# Patient Record
Sex: FEMALE | Race: WHITE
[De-identification: names, ages, dates, MRNs, and addresses within clinical notes are randomized per-mention and may not be internally consistent; named-entity substitution may affect disease eponyms.]

---

## 2019-03-01 ENCOUNTER — HOSPITAL ENCOUNTER (INPATIENT)
Dept: HOSPITAL 7 - FB.ED | Age: 69
LOS: 1 days | DRG: 193 | End: 2019-03-02
Attending: FAMILY MEDICINE | Admitting: FAMILY MEDICINE
Payer: MEDICARE

## 2019-03-01 DIAGNOSIS — C50.912: ICD-10-CM

## 2019-03-01 DIAGNOSIS — I24.8: ICD-10-CM

## 2019-03-01 DIAGNOSIS — E88.09: ICD-10-CM

## 2019-03-01 DIAGNOSIS — R79.89: ICD-10-CM

## 2019-03-01 DIAGNOSIS — E87.2: ICD-10-CM

## 2019-03-01 DIAGNOSIS — F32.9: ICD-10-CM

## 2019-03-01 DIAGNOSIS — E03.9: ICD-10-CM

## 2019-03-01 DIAGNOSIS — Z88.8: ICD-10-CM

## 2019-03-01 DIAGNOSIS — E11.42: ICD-10-CM

## 2019-03-01 DIAGNOSIS — Z51.5: ICD-10-CM

## 2019-03-01 DIAGNOSIS — Q60.0: ICD-10-CM

## 2019-03-01 DIAGNOSIS — Z79.4: ICD-10-CM

## 2019-03-01 DIAGNOSIS — N18.9: ICD-10-CM

## 2019-03-01 DIAGNOSIS — R49.0: ICD-10-CM

## 2019-03-01 DIAGNOSIS — D64.9: ICD-10-CM

## 2019-03-01 DIAGNOSIS — M19.90: ICD-10-CM

## 2019-03-01 DIAGNOSIS — R65.11: ICD-10-CM

## 2019-03-01 DIAGNOSIS — I12.9: ICD-10-CM

## 2019-03-01 DIAGNOSIS — J18.1: Primary | ICD-10-CM

## 2019-03-01 DIAGNOSIS — C78.7: ICD-10-CM

## 2019-03-01 DIAGNOSIS — N17.9: ICD-10-CM

## 2019-03-01 DIAGNOSIS — E78.5: ICD-10-CM

## 2019-03-01 DIAGNOSIS — D50.9: ICD-10-CM

## 2019-03-01 DIAGNOSIS — R74.8: ICD-10-CM
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
## 2019-03-01 DIAGNOSIS — Z79.890: ICD-10-CM

## 2019-03-01 DIAGNOSIS — R00.0: ICD-10-CM

## 2019-03-01 DIAGNOSIS — D84.9: ICD-10-CM

## 2019-03-01 DIAGNOSIS — K72.00: ICD-10-CM

## 2019-03-01 DIAGNOSIS — C78.02: ICD-10-CM

## 2019-03-01 DIAGNOSIS — J38.00: ICD-10-CM

## 2019-03-01 DIAGNOSIS — Z85.830: ICD-10-CM

## 2019-03-01 DIAGNOSIS — R06.02: ICD-10-CM

## 2019-03-01 DIAGNOSIS — Z79.899: ICD-10-CM

## 2019-03-01 DIAGNOSIS — Z87.891: ICD-10-CM

## 2019-03-01 DIAGNOSIS — J15.9: ICD-10-CM

## 2019-03-01 DIAGNOSIS — R06.00: ICD-10-CM

## 2019-03-01 DIAGNOSIS — R06.2: ICD-10-CM

## 2019-03-01 DIAGNOSIS — C78.01: ICD-10-CM

## 2019-03-01 DIAGNOSIS — R06.82: ICD-10-CM

## 2019-03-01 DIAGNOSIS — I46.9: ICD-10-CM

## 2019-03-01 DIAGNOSIS — I45.81: ICD-10-CM

## 2019-03-01 DIAGNOSIS — E86.0: ICD-10-CM

## 2019-03-01 DIAGNOSIS — J96.90: ICD-10-CM

## 2019-03-01 DIAGNOSIS — E11.65: ICD-10-CM

## 2019-03-01 DIAGNOSIS — Z91.048: ICD-10-CM

## 2019-03-01 DIAGNOSIS — R53.1: ICD-10-CM

## 2019-03-01 DIAGNOSIS — E11.22: ICD-10-CM

## 2019-03-01 PROCEDURE — 36415 COLL VENOUS BLD VENIPUNCTURE: CPT

## 2019-03-01 PROCEDURE — 86140 C-REACTIVE PROTEIN: CPT

## 2019-03-01 PROCEDURE — 83605 ASSAY OF LACTIC ACID: CPT

## 2019-03-01 PROCEDURE — 85025 COMPLETE CBC W/AUTO DIFF WBC: CPT

## 2019-03-01 PROCEDURE — 96365 THER/PROPH/DIAG IV INF INIT: CPT

## 2019-03-01 PROCEDURE — 96367 TX/PROPH/DG ADDL SEQ IV INF: CPT

## 2019-03-01 PROCEDURE — 87040 BLOOD CULTURE FOR BACTERIA: CPT

## 2019-03-01 PROCEDURE — 84484 ASSAY OF TROPONIN QUANT: CPT

## 2019-03-01 PROCEDURE — 94640 AIRWAY INHALATION TREATMENT: CPT

## 2019-03-01 PROCEDURE — 71046 X-RAY EXAM CHEST 2 VIEWS: CPT

## 2019-03-01 PROCEDURE — 80053 COMPREHEN METABOLIC PANEL: CPT

## 2019-03-01 PROCEDURE — 93005 ELECTROCARDIOGRAM TRACING: CPT

## 2019-03-01 NOTE — EDM.PDOC
ED HPI GENERAL MEDICAL PROBLEM





- General


Stated Complaint: SOB


Time Seen by Provider: 19 22:10


Source of Information: Reports: Patient, Family


History Limitations: Reports: No Limitations





- History of Present Illness


INITIAL COMMENTS - FREE TEXT/NARRATIVE: 





c/o weak, sob x 2d





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





on chemo q3w, received 2 doses, no chemo 2d ago d/t low WBC





lives alone, here with daughter Eli and son-in-law Best





quite weak x 2d, difficulty to sit in w/c





has had inc'd sob, no cough, poor appetitie, no f/c/d





is tachy now with rales at the L base





h/o 0.5 ppd x 20y, stopped 20y ago





dx breast CA 7.5y ago





in [place] 5w ago for 6d, had CA compressing her vocal cords and is hoarse, had 

gotten RT in past, not in past 2m, voice has been hoarse





- Related Data


 Allergies











Allergy/AdvReac Type Severity Reaction Status Date / Time


 


lisinipril Allergy  Edema Uncoded 19 22:22


 


seasonal allergies Allergy  Other Uncoded 19 22:22











Home Meds: 


 Home Meds





Calcium Carbonate [Calcium] 600 mg PO DAILY 16 [History]


Cetirizine [ZyrTEC] 10 mg PO DAILY 16 [History]


Gabapentin [Neurontin] 300 mg PO BEDTIME 16 [History]


Insulin Glarg,Human.Rec.Analog [Lantus Solostar] 12 units SUBCUT BEDTIME  [History]


Levothyroxine Sodium [Synthroid] 150 mcg PO DAILY 16 [History]


Metoprolol Tartrate 25 mg PO BID 16 [History]


Zolpidem Tartrate [Zolpidem Tartrate ER] 12.5 mg PO BEDTIME PRN 16 [

History]


hydroCHLOROthiazide [Hydrochlorothiazide] 12.5 mg PO DAILY 16 [History]


Cholecalciferol (Vitamin D3) [Vitamin D3] 2,000 unit PO BEDTIME 17 [

History]


Hydrocodone/Acetaminophen [Hydrocodon-Acetaminophn ] 1 - 2 tab PO Q4H PRN 

17 [History]


Melatonin 5 mg PO BEDTIME 17 [History]


Omeprazole 20 mg PO DAILY 17 [History]


Potassium Chloride 10 meq PO BEDTIME 17 [History]


metFORMIN [Glucophage] 1,000 mg PO BIDMEALS 17 [History]


Escitalopram [Lexapro] 10 mg PO DAILY 19 [History]











Past Medical History


HEENT History: Reports: Other (See Below)


Other HEENT History: pt has hoarse sounding voice at times.  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


Cardiovascular History: Reports: Hypertension


Respiratory History: Reports: Other (See Below)


Other Respiratory History: seasonal allergies and gets sinus congestion with 

this.


Gastrointestinal History: Reports: Other (See Below)


Other Gastrointestinal History: only has one kidney


OB/GYN History: Reports: Pregnancy


Other OB/GYN History: 


Musculoskeletal History: Reports: Osteoarthritis


Neurological History: Reports: Neuropathy, Peripheral


Psychiatric History: Reports: Depression


Endocrine/Metabolic History: Reports: Diabetes, Type II, Hypothyroidism


Hematologic History: Reports: None, Blood Transfusion(s)


Immunologic History: Reports: None


Oncologic (Cancer) History: Reports: Other (See Below)


Other Oncologic History: METASTATIC STAGE FOUR LEFT BREAST CANCER AT PRESENT 

TIME.


Dermatologic History: Reports: Other (See Below)


Other Dermatologic History: PT HAS SEVERAL HEALING CIRCULAR SORES ON BODY AFTER 

CHEMO TREATMENTS AT PRESENT TIME.  NONE ARE OPEN OR DRAINING AT PRESENT TIME.





- Infectious Disease History


Infectious Disease History: Reports: Chicken Pox, Measles





- Past Surgical History


HEENT Surgical History: Reports: None


Cardiovascular Surgical History: Reports: None


Respiratory Surgical History: Reports: None


Other Female  Surgeries/Procedures: "I have only one kidney, doesn't know 

which one--was found on a CT scan"


Neurological Surgical History: Reports: None


Oncologic Surgical History: Reports: Biopsy of Breast





Social & Family History





- Family History


Family Medical History: Noncontributory


Cardiac: Reports: MI


: Reports: Renal Disease/Insufficiency


Other Dermatologic Family History: dad natural causes, mom old age





- Caffeine Use


Caffeine Use: Reports: Other


Other Caffeine Use: occasionally, not daily





ED ROS GENERAL





- Review of Systems


Review Of Systems: See Below


Constitutional: Reports: Weakness, Fatigue, Decreased Appetite, Weight Loss.  

Denies: Fever, Chills, Night Sweats, Diaphoresis


HEENT: Reports: No Symptoms


Respiratory: Reports: Shortness of Breath, Cough


Cardiovascular: Reports: No Symptoms


Endocrine: Reports: No Symptoms


GI/Abdominal: Reports: No Symptoms, Other (soft normal BMs)


: Reports: No Symptoms


Musculoskeletal: Reports: No Symptoms


Skin: Reports: No Symptoms


Neurological: Reports: No Symptoms


Psychiatric: Reports: No Symptoms


Hematologic/Lymphatic: Reports: No Symptoms


Immunologic: Reports: No Symptoms





ED EXAM, GENERAL





- Physical Exam


Exam: See Below


Exam Limited By: No Limitations


General Appearance: Alert, WD/WN, Mild Distress, Other (sit in w/c slumped 

forward, good eye contact, voice hoarse, tries to be pleasant, little energy, 

nontoxic, mild dyspnea, no cough observed, quite tachy, wheezes noted b/l, 

rales at L base)


Eye Exam: Bilateral Eye: Normal Inspection, PERRL


Ears: Normal External Exam, Hearing Grossly Normal


Nose: Normal Inspection, Normal Mucosa, No Blood


Throat/Mouth: Normal Inspection, Normal Lips, Normal Teeth, Normal Gums, Normal 

Oropharynx, Normal Voice, No Airway Compromise


Head: Atraumatic, Normocephalic


Neck: Normal Inspection, Supple, Non-Tender, Full Range of Motion.  No: 

Lymphadenopathy (R), Lymphadenopathy (L)


Respiratory/Chest: Chest Non-Tender, Other (mild tachypnea, rales L base, mild 

insp/exp wheezes b/l, no retractions, no purse lip, slight use of accessory 

muscles at times)


Cardiovascular: No Edema, No Gallop, No JVD, No Rub, Other (mild dec'd turgor, )


GI/Abdominal: Normal Bowel Sounds, Soft, Non-Tender, No Organomegaly, No 

Distention, No Mass


Back Exam: Normal Inspection, Full Range of Motion.  No: CVA Tenderness (R), 

CVA Tenderness (L)


Extremities: Normal Inspection, Normal Range of Motion, Non-Tender, No Pedal 

Edema, Other (dec'd turgor, no tenting)


Neurological: Alert, Oriented, CN II-XII Intact, Normal Cognition, No Motor/

Sensory Deficits


Psychiatric: Normal Affect, Normal Mood


Skin Exam: Warm, Dry, Intact, Normal Color, No Rash


Lymphatic: No Adenopathy





Course





- Vital Signs


Last Recorded V/S: 


 Last Vital Signs











Temp  37.3 C   19 22:15


 


Pulse  140 H  19 22:15


 


Resp  24 H  19 22:15


 


BP  110/57 L  19 22:15


 


Pulse Ox  92 L  19 22:15














- Orders/Labs/Meds


Orders: 


 Active Orders 24 hr











 Category Date Time Status


 


 Admission Status [Patient Status] [ADT] Routine ADT  19 00:03 Ordered


 


 EKG Documentation Completion [RC] ASDIRECTED Care  19 22:22 Ordered


 


 RT Aerosol Therapy [RC] ASDIRECTED Care  19 22:52 Ordered


 


 RT Aerosol Therapy [RC] ASDIRECTED Care  19 00:03 Ordered


 


 Chest 2V [CR] Stat Exams  19 22:20 Ordered


 


 CULTURE BLOOD [BC] Routine Lab  19 Ordered


 


 CULTURE BLOOD [BC] Stat Lab  19 22:20 Ordered


 


 INFLUENZA A+B AG SCREEN [RM] Stat Lab  19 22:24 Ordered


 


 UA W/MICROSCOPIC [URIN] Stat Lab  19 22:20 Ordered


 


 Heparin Sodium [Heparin Lock Flush 100 Units/ML] Med  19 00:01 Ordered





 500 units FLUSH ASDIRECTED PRN   


 


 Piperacillin/Tazobactam [Zosyn] 4.5 gm Med  19 22:30 Ordered





 Sodium Chloride 0.9% [Normal Saline] 100 ml   





 IV Q6H   


 


 EKG 12 Lead [EK] Routine Ther  19 22:20 Ordered








 Medication Orders





Heparin Sodium (Porcine) (Heparin Lock Flush 100 Units/Ml)  500 units FLUSH 

ASDIRECTED PRN


   PRN Reason: Keep Vein Open


Piperacillin Sod/Tazobactam (Sod 4.5 gm/ Sodium Chloride)  100 mls @ 200 mls/hr 

IV Q6H ANTHONY


   Last Admin: 19 23:16  Dose: 200 mls/hr








Labs: 


 Laboratory Tests











  19 Range/Units





  22:45 22:45 22:45 


 


WBC  12.3 H    (4.5-12.0)  X10-3/uL


 


RBC  3.16 L    (3.23-5.20)  x10(6)uL


 


Hgb  8.7 L    (11.5-15.5)  g/dL


 


Hct  27.0 L    (30.0-51.3)  %


 


MCV  85.5    (80-96)  fL


 


MCH  27.7    (27.7-33.6)  pg


 


MCHC  32.4    (32.2-35.4)  g/dL


 


RDW  20.7 H    (11.5-15.5)  %


 


Plt Count  666 H    (125-369)  X10(3)uL


 


MPV  7.7    (7.4-10.4)  fL


 


Neut % (Auto)  89.3 H    (46-82)  %


 


Lymph % (Auto)  7.0 L    (13-37)  %


 


Mono % (Auto)  3.5 L    (4-12)  %


 


Eos % (Auto)  0 L    (1.0-5.0)  %


 


Baso % (Auto)  0    (0-2)  %


 


Neut # (Auto)  11.0 H    (1.6-8.3)  #


 


Lymph # (Auto)  0.9    (0.6-5.0)  #


 


Mono # (Auto)  0.4    (0.0-1.3)  #


 


Eos # (Auto)  0.0    (0.0-0.8)  #


 


Baso # (Auto)  0.0    (0.0-0.2)  #


 


Sodium   133 L   (135-145)  mmol/L


 


Potassium   5.2   (3.5-5.3)  mmol/L


 


Chloride   92 L   (100-110)  mmol/L


 


Carbon Dioxide   16 L   (21-32)  mmol/L


 


BUN   31 H   (7-18)  mg/dL


 


Creatinine   2.9 H*   (0.55-1.02)  mg/dL


 


Est Cr Clr Drug Dosing   14.28   mL/min


 


Estimated GFR (MDRD)   16 L   (>60)  


 


BUN/Creatinine Ratio   10.7   (9-20)  


 


Glucose   360 H   ()  mg/dL


 


Lactic Acid     (0.4-2.2)  mmol/L


 


Calcium   8.9   (8.6-10.2)  mg/dL


 


Total Bilirubin   0.5   (0.1-1.3)  mg/dL


 


AST   796 H*   (5-25)  IU/L


 


ALT   435 H*   (12-36)  U/L


 


Alkaline Phosphatase   79   ()  IU/L


 


Troponin I    0.061 H  (<0.017-0.056)  ng/mL


 


C-Reactive Protein    31.6 H*  (0.5-0.9)  mg/dL


 


Total Protein   8.0   (6.0-8.0)  g/dL


 


Albumin   2.9 L   (3.2-4.6)  g/dL


 


Globulin   5.1   g/dL


 


Albumin/Globulin Ratio   0.6   














  19 Range/Units





  22:45 


 


WBC   (4.5-12.0)  X10-3/uL


 


RBC   (3.23-5.20)  x10(6)uL


 


Hgb   (11.5-15.5)  g/dL


 


Hct   (30.0-51.3)  %


 


MCV   (80-96)  fL


 


MCH   (27.7-33.6)  pg


 


MCHC   (32.2-35.4)  g/dL


 


RDW   (11.5-15.5)  %


 


Plt Count   (125-369)  X10(3)uL


 


MPV   (7.4-10.4)  fL


 


Neut % (Auto)   (46-82)  %


 


Lymph % (Auto)   (13-37)  %


 


Mono % (Auto)   (4-12)  %


 


Eos % (Auto)   (1.0-5.0)  %


 


Baso % (Auto)   (0-2)  %


 


Neut # (Auto)   (1.6-8.3)  #


 


Lymph # (Auto)   (0.6-5.0)  #


 


Mono # (Auto)   (0.0-1.3)  #


 


Eos # (Auto)   (0.0-0.8)  #


 


Baso # (Auto)   (0.0-0.2)  #


 


Sodium   (135-145)  mmol/L


 


Potassium   (3.5-5.3)  mmol/L


 


Chloride   (100-110)  mmol/L


 


Carbon Dioxide   (21-32)  mmol/L


 


BUN   (7-18)  mg/dL


 


Creatinine   (0.55-1.02)  mg/dL


 


Est Cr Clr Drug Dosing   mL/min


 


Estimated GFR (MDRD)   (>60)  


 


BUN/Creatinine Ratio   (9-20)  


 


Glucose   ()  mg/dL


 


Lactic Acid  10.4 H*  (0.4-2.2)  mmol/L


 


Calcium   (8.6-10.2)  mg/dL


 


Total Bilirubin   (0.1-1.3)  mg/dL


 


AST   (5-25)  IU/L


 


ALT   (12-36)  U/L


 


Alkaline Phosphatase   ()  IU/L


 


Troponin I   (<0.017-0.056)  ng/mL


 


C-Reactive Protein   (0.5-0.9)  mg/dL


 


Total Protein   (6.0-8.0)  g/dL


 


Albumin   (3.2-4.6)  g/dL


 


Globulin   g/dL


 


Albumin/Globulin Ratio   











Meds: 


Medications











Generic Name Dose Route Start Last Admin





  Trade Name Karanq  PRN Reason Stop Dose Admin


 


Heparin Sodium (Porcine)  500 units  19 00:01  





  Heparin Lock Flush 100 Units/Ml  FLUSH   





  ASDIRECTED PRN   





  Keep Vein Open   





     





     





     


 


Piperacillin Sod/Tazobactam  100 mls @ 200 mls/hr  19 22:30  19 23:

16





  Sod 4.5 gm/ Sodium Chloride  IV   200 mls/hr





  Q6H ANHTONY   Administration





     





     





     





     














Discontinued Medications














Generic Name Dose Route Start Last Admin





  Trade Name Karanq  PRN Reason Stop Dose Admin


 


Albuterol/Ipratropium  3 ml  19 22:52  19 23:10





  Duoneb 3.0-0.5 Mg/3 Ml  NEB  19 22:53  3 ml





  ONETIME ONE   Administration





     





     





     





     


 


Albuterol/Ipratropium  3 ml  19 00:03  





  Duoneb 3.0-0.5 Mg/3 Ml  NEB  19 00:04  





  ONETIME ONE   





     





     





     





     


 


Sodium Chloride  1,000 mls @ 999 mls/hr  19 22:23  19 23:13





  Normal Saline  IV  19 23:23  999 mls/hr





  .BOLUS ONE   Administration





     





     





     





     


 


Vancomycin HCl 1 gm/ Sodium  250 mls @ 167 mls/hr  19 22:23  19 23:

51





  Chloride  IV  19 23:52  167 mls/hr





  ONETIME ONE   Administration





     





     





     





     


 


Vancomycin HCl  Confirm  19 23:23  19 23:49





  Vancomycin  Administered  19 23:24  Not Given





  Dose   





  1 gm   





  .ROUTE   





  .STK-MED ONE   





     





     





     





     














- Re-Assessments/Exams


Free Text/Narrative Re-Assessment/Exam: 





19 00:06


CxR 2v with pul mets b/l, does have inc'd marking in RML c/w RML pneumonia, 

radiology reading pending





not neutropenic, has WBC 12.3, trop 0.61 and borderline high c/w demand 

ischemia from tachycardia and lactic acid 10.4





EKG WNL except for bordelrine prolonged QTc 492, will obtain a mag





inc'd BUN/creat 31/2.9, up from 23/0.8





CRP 31.6





ASL//435 c/w liver mets, alk phos WNL





alb 2.9





daughter, a pharmacist, reports that pt scheduled for MR in 4d at [facility] to see 

if there is a fistula causing aspiration pneumonia





pt given Zosyn and vanco, will add additional anaerobic coverage





pt prefers to be admitted her for IV antbxs and IVF rather than being 

transferred to [facility]





Departure





- Departure


Time of Disposition: 00:10


Disposition: Admitted As Inpatient 64


Condition: Fair


Clinical Impression: 


 Right middle lobe pneumonia, Wheezing, Immunocompromised, Lactic acidosis, 

Moderate dehydration, Acute on chronic renal insufficiency, Hypoalbuminemia, 

Hoarseness, Elevated troponin, Elevated liver function tests, Normocytic 

hypochromic anemia, Diabetes mellitus with hyperglycemia








- Discharge Information


*PRESCRIPTION DRUG MONITORING PROGRAM REVIEWED*: Not Applicable


*COPY OF PRESCRIPTION DRUG MONITORING REPORT IN PATIENT VERENICE: Not Applicable


Referrals: 


Jc Elizondo MD [Primary Care Provider] - 





- My Orders


Last 24 Hours: 


My Active Orders





19


CULTURE BLOOD [BC] Routine 





19 22:20


Chest 2V [CR] Stat 


CULTURE BLOOD [BC] Stat 


UA W/MICROSCOPIC [URIN] Stat 


EKG 12 Lead [EK] Routine 





19 22:22


EKG Documentation Completion [RC] ASDIRECTED 





19 22:24


INFLUENZA A+B AG SCREEN [RM] Stat 





19 22:30


Piperacillin/Tazobactam [Zosyn] 4.5 gm   Sodium Chloride 0.9% [Normal Saline] 

100 ml IV Q6H 





19 22:52


RT Aerosol Therapy [RC] ASDIRECTED 





19 00:01


Heparin Sodium [Heparin Lock Flush 100 Units/ML]   500 units FLUSH ASDIRECTED 

PRN 





19 00:03


Admission Status [Patient Status] [ADT] Routine 


RT Aerosol Therapy [RC] ASDIRECTED 














- Assessment/Plan


Last 24 Hours: 


My Active Orders





19


CULTURE BLOOD [BC] Routine 





19 22:20


Chest 2V [CR] Stat 


CULTURE BLOOD [BC] Stat 


UA W/MICROSCOPIC [URIN] Stat 


EKG 12 Lead [EK] Routine 





19 22:22


EKG Documentation Completion [RC] ASDIRECTED 





19 22:24


INFLUENZA A+B AG SCREEN [RM] Stat 





19 22:30


Piperacillin/Tazobactam [Zosyn] 4.5 gm   Sodium Chloride 0.9% [Normal Saline] 

100 ml IV Q6H 





19 22:52


RT Aerosol Therapy [RC] ASDIRECTED 





19 00:01


Heparin Sodium [Heparin Lock Flush 100 Units/ML]   500 units FLUSH ASDIRECTED 

PRN 





19 00:03


Admission Status [Patient Status] [ADT] Routine 


RT Aerosol Therapy [RC] ASDIRECTED

## 2019-03-02 VITALS — SYSTOLIC BLOOD PRESSURE: 141 MMHG | DIASTOLIC BLOOD PRESSURE: 70 MMHG

## 2019-03-02 PROCEDURE — 5A12012 PERFORMANCE OF CARDIAC OUTPUT, SINGLE, MANUAL: ICD-10-PCS | Performed by: FAMILY MEDICINE

## 2019-03-02 NOTE — PCM.HP
H&P History of Present Illness





- General


Date of Service: 19


Admit Problem/Dx: 


 Admission Diagnosis/Problem





Admission Diagnosis/Problem      Pneumonia








Source of Information: Patient, Old Records


History Limitations: Reports: No Limitations





- History of Present Illness


Initial Comments - Free Text/Narative: 


Mallory is a 68-year-old female was admitted because of increasing shortness of 

breath and cough accompanied by weakness for 2-3 days. She has a history of 

metastatic breast cancer currently undergoing chemotherapy every 3 weeks. The 

last one being about 3 weeks ago. She denies fever chills, but she was in the 

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 

She has a history of smoking but quit 20 years ago. Her breast cancer was 

diagnosed in . Mallory also has a history of hypertension, recent afib, 

converted to sinus rhythm, and history of hypothyroidism hypomagnesemia and 

hyperlipidemia the previously stable.





- Related Data


Allergies/Adverse Reactions: 


 Allergies











Allergy/AdvReac Type Severity Reaction Status Date / Time


 


lisinipril Allergy  Edema Uncoded 19 22:22


 


seasonal allergies Allergy  Other Uncoded 19 22:22











Home Medications: 


 Home Meds





Calcium Carbonate [Calcium] 600 mg PO DAILY 16 [History]


Cetirizine [ZyrTEC] 10 mg PO DAILY 16 [History]


Gabapentin [Neurontin] 300 mg PO BEDTIME 16 [History]


Insulin Glarg,Human.Rec.Analog [Lantus Solostar] 12 units SUBCUT BEDTIME  [History]


Levothyroxine Sodium [Synthroid] 150 mcg PO DAILY 16 [History]


Metoprolol Tartrate 25 mg PO BID 16 [History]


Zolpidem Tartrate [Zolpidem Tartrate ER] 12.5 mg PO BEDTIME PRN 16 [

History]


hydroCHLOROthiazide [Hydrochlorothiazide] 12.5 mg PO DAILY 16 [History]


Cholecalciferol (Vitamin D3) [Vitamin D3] 2,000 unit PO BEDTIME 17 [

History]


Hydrocodone/Acetaminophen [Hydrocodon-Acetaminophn ] 1 - 2 tab PO Q4H PRN 

17 [History]


Melatonin 5 mg PO BEDTIME 17 [History]


Omeprazole 20 mg PO DAILY 17 [History]


Potassium Chloride 10 meq PO BEDTIME 17 [History]


metFORMIN [Glucophage] 1,000 mg PO BIDMEALS 17 [History]


Escitalopram [Lexapro] 10 mg PO DAILY 19 [History]











Past Medical History


HEENT History: Reports: Other (See Below)


Other HEENT History: Hoarse sound voice at times, states her vocal cords on 

left side of neck are paralized.


Cardiovascular History: Reports: Hypertension


Respiratory History: Reports: Other (See Below)


Other Respiratory History: Seasonal allergies, gets sinus congestion.


Gastrointestinal History: Reports: Other (See Below)


Other Gastrointestinal History: Only one kidney, found on CT.


Genitourinary History: Reports: Other (See Below)


Other Genitourinary History: born with 1 kidney


OB/GYN History: Reports: Pregnancy


Other OB/BYN History: 


Musculoskeletal History: Reports: Osteoarthritis


Other Musculoskeletal History: CA spine & L hip


Neurological History: Reports: Neuropathy, Peripheral


Psychiatric History: Reports: Depression


Endocrine/Metabolic History: Reports: Diabetes, Type II, Hypothyroidism


Hematologic History: Reports: Blood Transfusion(s)


Immunologic History: Reports: None


Oncologic (Cancer) History: Reports: Other (See Below)


Other Oncologic History: Stage 4 breast cancer, under the care of oncology.


Dermatologic History: Reports: Other (See Below)


Other Dermatologic History: Several circular sores on body from chemotherapy.  

Healing, none are open or draining.





- Infectious Disease History


Infectious Disease History: Reports: Chicken Pox, Measles





- Past Surgical History


Oncologic Surgical History: Reports: Biopsy of Breast





Social & Family History





- Family History


Family Medical History: Noncontributory


Cardiac: Reports: MI


: Reports: Renal Disease/Insufficiency


Other Dermatologic Family History: dad natural causes, mom old age





- Tobacco Use


Smoking Status *Q: Former Smoker


Years of Tobacco use: 20


Used Tobacco, but Quit: No


Month/Year Tobacco Last Used: 





- Caffeine Use


Caffeine Use: Reports: Coffee


Other Caffeine Use: Daughter states patient drinks coffee only a couple times a 

week.





- Recreational Drug Use


Recreational Drug Use: No





H&P Review of Systems





- Review of Systems:


Review Of Systems: ROS reveals no pertinent complaints other than HPI.





Exam





- Exam


Exam: See Below





- Vital Signs


Vital Signs: 


 Last Vital Signs











Temp  99.0 F   19 08:00


 


Pulse  100   19 08:00


 


Resp  24 H  19 08:00


 


BP  160/84 H  19 08:00


 


Pulse Ox  88 L  19 08:00











Weight: 61.099 kg





- Exam


Quality Assessment: Supplemental Oxygen


General: Alert, Oriented, 4


HEENT: PERRLA, Hearing Intact, Mucosa Moist & Pink, Nares Patent, Normal Nasal 

Septum, Posterior Pharynx Clear, Conjunctiva Clear, EOMI, EACs Clear, TMs Clear


Neck: Supple, Trachea Midline, 2


Lungs: Crackles, Rales, Rhonchi, Stridor


Cardiovascular: Regular Rate, Regular Rhythm


GI/Abdominal Exam: Normal Bowel Sounds, Soft, Non-Tender, No Organomegaly, No 

Distention, No Abnormal Bruit, No Mass, Pelvis Stable


 (Female) Exam: Deferred


Rectal (Female) Exam: Deferred


Back Exam: Normal Inspection, Full Range of Motion, NT


Extremities: Normal Inspection, Normal Range of Motion, Non-Tender, No Pedal 

Edema, Normal Capillary Refill


Skin: Warm, Dry, Intact


Neurological: Cranial Nerves Intact, Reflexes Equal Bilateral


Neuro Extensive - Mental Status: Alert, Oriented x3, Normal Mood/Affect, Normal 

Cognition


Neuro Extensive - Motor, Sensory, Reflexes: CN II-XII Intact, Normal Gait, 

Normal Reflexes


Psychiatric: Alert, Normal Affect, Normal Mood





- Patient Data


Lab Results Last 24 hrs: 


 Laboratory Results - last 24 hr











  19 Range/Units





  22:45 22:45 22:45 


 


WBC  12.3 H    (4.5-12.0)  X10-3/uL


 


RBC  3.16 L    (3.23-5.20)  x10(6)uL


 


Hgb  8.7 L    (11.5-15.5)  g/dL


 


Hct  27.0 L    (30.0-51.3)  %


 


MCV  85.5    (80-96)  fL


 


MCH  27.7    (27.7-33.6)  pg


 


MCHC  32.4    (32.2-35.4)  g/dL


 


RDW  20.7 H    (11.5-15.5)  %


 


Plt Count  666 H    (125-369)  X10(3)uL


 


MPV  7.7    (7.4-10.4)  fL


 


Neut % (Auto)  Np    


 


Lymph % (Auto)  Np    


 


Mono % (Auto)  Np    


 


Eos % (Auto)  Np    


 


Baso % (Auto)  Np    


 


Neut # (Auto)  Np    


 


Lymph # (Auto)  Np    


 


Mono # (Auto)  Np    


 


Eos # (Auto)  Np    


 


Baso # (Auto)  Np    


 


Add Manual Diff  Yes    


 


Neutrophils % (Manual)  40 L    (46-82)  %


 


Band Neutrophils %  34 H*    (0-6)  %


 


Lymphocytes % (Manual)  10 L    (13-37)  %


 


Monocytes % (Manual)  4    (4-12)  %


 


Metamyelocytes %  9 H    (0-0)  %


 


Myelocytes %  3 H    (0-0)  %


 


Hypochromasia  Few    


 


Anisocytosis  Moderate H    


 


Sodium   133 L   (135-145)  mmol/L


 


Potassium   5.2   (3.5-5.3)  mmol/L


 


Chloride   92 L   (100-110)  mmol/L


 


Carbon Dioxide   16 L   (21-32)  mmol/L


 


BUN   31 H   (7-18)  mg/dL


 


Creatinine   2.9 H*   (0.55-1.02)  mg/dL


 


Est Cr Clr Drug Dosing   14.28   mL/min


 


Estimated GFR (MDRD)   16 L   (>60)  


 


BUN/Creatinine Ratio   10.7   (9-20)  


 


Glucose   360 H   ()  mg/dL


 


Lactic Acid     (0.4-2.2)  mmol/L


 


Calcium   8.9   (8.6-10.2)  mg/dL


 


Total Bilirubin   0.5   (0.1-1.3)  mg/dL


 


AST   796 H*   (5-25)  IU/L


 


ALT   435 H*   (12-36)  U/L


 


Alkaline Phosphatase   79   ()  IU/L


 


Troponin I    0.061 H  (<0.017-0.056)  ng/mL


 


C-Reactive Protein    31.6 H*  (0.5-0.9)  mg/dL


 


Total Protein   8.0   (6.0-8.0)  g/dL


 


Albumin   2.9 L   (3.2-4.6)  g/dL


 


Globulin   5.1   g/dL


 


Albumin/Globulin Ratio   0.6   


 


Urine Color     (YELLOW)  


 


Urine Appearance     (CLEAR)  


 


Urine pH     (5.0-6.5)  


 


Ur Specific Gravity     (1.010-1.025)  


 


Urine Protein     (NEGATIVE)  mg/dL


 


Urine Glucose (UA)     (NEGATIVE)  mg/dL


 


Urine Ketones     (NEGATIVE)  mg/dL


 


Urine Occult Blood     (NEGATIVE)  


 


Urine Nitrite     (NEGATIVE)  


 


Urine Bilirubin     (NEGATIVE)  


 


Urine Urobilinogen     (NEGATIVE)  mg/dL


 


Ur Leukocyte Esterase     (NEGATIVE)  


 


Urine RBC     (0)  


 


Urine WBC     (0)  


 


Ur Squamous Epith Cells     (NS,R,O)  


 


Amorphous Sediment     


 


Urine Bacteria     (NS)  














  19 Range/Units





  22:45 00:32 09:15 


 


WBC    15.3 H  (4.5-12.0)  X10-3/uL


 


RBC    2.62 L  (3.23-5.20)  x10(6)uL


 


Hgb    7.2 L  (11.5-15.5)  g/dL


 


Hct    21.9 L*  (30.0-51.3)  %


 


MCV    83.7  (80-96)  fL


 


MCH    27.5 L  (27.7-33.6)  pg


 


MCHC    32.9  (32.2-35.4)  g/dL


 


RDW    20.6 H  (11.5-15.5)  %


 


Plt Count    605 H  (125-369)  X10(3)uL


 


MPV    7.3 L  (7.4-10.4)  fL


 


Neut % (Auto)     


 


Lymph % (Auto)     


 


Mono % (Auto)     


 


Eos % (Auto)     


 


Baso % (Auto)     


 


Neut # (Auto)     


 


Lymph # (Auto)     


 


Mono # (Auto)     


 


Eos # (Auto)     


 


Baso # (Auto)     


 


Add Manual Diff    Yes  


 


Neutrophils % (Manual)     (46-82)  %


 


Band Neutrophils %     (0-6)  %


 


Lymphocytes % (Manual)     (13-37)  %


 


Monocytes % (Manual)     (4-12)  %


 


Metamyelocytes %     (0-0)  %


 


Myelocytes %     (0-0)  %


 


Hypochromasia     


 


Anisocytosis     


 


Sodium     (135-145)  mmol/L


 


Potassium     (3.5-5.3)  mmol/L


 


Chloride     (100-110)  mmol/L


 


Carbon Dioxide     (21-32)  mmol/L


 


BUN     (7-18)  mg/dL


 


Creatinine     (0.55-1.02)  mg/dL


 


Est Cr Clr Drug Dosing     mL/min


 


Estimated GFR (MDRD)     (>60)  


 


BUN/Creatinine Ratio     (9-20)  


 


Glucose     ()  mg/dL


 


Lactic Acid  10.4 H*    (0.4-2.2)  mmol/L


 


Calcium     (8.6-10.2)  mg/dL


 


Total Bilirubin     (0.1-1.3)  mg/dL


 


AST     (5-25)  IU/L


 


ALT     (12-36)  U/L


 


Alkaline Phosphatase     ()  IU/L


 


Troponin I     (<0.017-0.056)  ng/mL


 


C-Reactive Protein     (0.5-0.9)  mg/dL


 


Total Protein     (6.0-8.0)  g/dL


 


Albumin     (3.2-4.6)  g/dL


 


Globulin     g/dL


 


Albumin/Globulin Ratio     


 


Urine Color   Yellow   (YELLOW)  


 


Urine Appearance   Clear   (CLEAR)  


 


Urine pH   5.0   (5.0-6.5)  


 


Ur Specific Gravity   1.025   (1.010-1.025)  


 


Urine Protein   100 H   (NEGATIVE)  mg/dL


 


Urine Glucose (UA)   100 H   (NEGATIVE)  mg/dL


 


Urine Ketones   15 H   (NEGATIVE)  mg/dL


 


Urine Occult Blood   Moderate H   (NEGATIVE)  


 


Urine Nitrite   Negative   (NEGATIVE)  


 


Urine Bilirubin   Small H   (NEGATIVE)  


 


Urine Urobilinogen   Normal   (NEGATIVE)  mg/dL


 


Ur Leukocyte Esterase   Negative   (NEGATIVE)  


 


Urine RBC   0-5   (0)  


 


Urine WBC   0-5   (0)  


 


Ur Squamous Epith Cells   Occasional   (NS,R,O)  


 


Amorphous Sediment   Few   


 


Urine Bacteria   Few H   (NS)  











Result Diagrams: 


 19 09:15





 19 22:45


Alexandro Results Last 24 hrs: 


 Microbiology











 19 00:15 Influenza Type A Antigen Screen - Final





 Nasal, Unspecified    NEGATIVE INFLUENZA A VIRUS AG





 Influenza Type B Antigen Screen - Final





    NEGATIVE INFLUENZA B VIRUS AG














EKG INTERPRETATION


Rhythm: NSR





- Problem List


(1) Healthcare associated bacterial pneumonia


SNOMED Code(s): 198811470


   ICD Code: J15.9 - UNSPECIFIED BACTERIAL PNEUMONIA   Status: Acute   Current 

Visit: Yes   





(2) Metastatic breast cancer


SNOMED Code(s): 137318662, 155601899


   ICD Code: C50.919 - MALIGNANT NEOPLASM OF UNSP SITE OF UNSPECIFIED FEMALE 

BREAST   Status: Acute   Current Visit: Yes   





(3) HUANG (acute kidney injury)


SNOMED Code(s): 20597631


   ICD Code: N17.9 - ACUTE KIDNEY FAILURE, UNSPECIFIED   Status: Acute   

Current Visit: Yes   





(4) Bandemia


SNOMED Code(s): 016322289


   ICD Code: D72.825 - BANDEMIA   Status: Acute   Current Visit: Yes   





(5) Hoarseness


SNOMED Code(s): 64367814


   ICD Code: R49.0 - DYSPHONIA   Status: Acute   Current Visit: Yes   





(6) Immunocompromised


SNOMED Code(s): 849004189


   ICD Code: D84.9 - IMMUNODEFICIENCY, UNSPECIFIED   Status: Acute   Current 

Visit: Yes   





(7) Moderate dehydration


SNOMED Code(s): 7563140801734


   ICD Code: E86.0 - DEHYDRATION   Status: Acute   Current Visit: Yes   





(8) Normocytic hypochromic anemia


SNOMED Code(s): 65318176


   ICD Code: D50.9 - IRON DEFICIENCY ANEMIA, UNSPECIFIED   Status: Acute   

Current Visit: Yes   





(9) Diabetes mellitus


SNOMED Code(s): 95919582


   ICD Code: E11.9 - TYPE 2 DIABETES MELLITUS WITHOUT COMPLICATIONS   Status: 

Chronic   Current Visit: No   


Qualifiers: 


   Diabetes mellitus type: type 2   Diabetes mellitus complication status: with 

neurologic complications   Diabetes mellitus complication detail: with 

unspecified neuropathy   Qualified Code(s): E11.40 - Type 2 diabetes mellitus 

with diabetic neuropathy, unspecified   





(10) Hyperlipidemia


SNOMED Code(s): 51920601


   ICD Code: E78.5 - HYPERLIPIDEMIA, UNSPECIFIED   Status: Chronic   Current 

Visit: No   


Qualifiers: 


   Hyperlipidemia type: Pure hypercholesterolemia 





(11) Hypertension


SNOMED Code(s): 45778465


   ICD Code: I10 - ESSENTIAL (PRIMARY) HYPERTENSION   Status: Chronic   Current 

Visit: No   


Qualifiers: 


   Hypertension type: essential hypertension   Qualified Code(s): I10 - 

Essential (primary) hypertension   





(12) Hypothyroidism


SNOMED Code(s): 23494717


   ICD Code: E03.9 - HYPOTHYROIDISM, UNSPECIFIED   Status: Chronic   Current 

Visit: No   


Qualifiers: 


   Hypothyroidism type: unspecified   Qualified Code(s): E03.9 - Hypothyroidism

, unspecified   





(13) Palliative care encounter


SNOMED Code(s): 208257130


   ICD Code: Z51.5 - ENCOUNTER FOR PALLIATIVE CARE   Status: Acute   Current 

Visit: Yes   


Problem List Initiated/Reviewed/Updated: Yes


Orders Last 24hrs: 


 Active Orders 24 hr











 Category Date Time Status


 


 Admission Status [Patient Status] [ADT] Routine ADT  19 00:03 Active


 


 Blood Glucose Check, Bedside [RC] QIDACANDBED Care  19 01:04 Active


 


 Oxygen Therapy [RC] PRN Care  19 01:04 Active


 


 Pulse Oximetry [RC] PRN Care  19 01:05 Active


 


 RT Aerosol Therapy [RC] 07,11,16,21 Care  19 01:07 Active


 


 Up With Assistance [RC] ASDIRECTED Care  19 01:04 Active


 


 Vital Signs [RC] 00,04,08,12,16,20 Care  19 01:04 Active


 


 Consistent Carbohydrate Diet [DIET] Diet  19 Breakfast Active


 


 Chest 2V [CR] Stat Exams  19 22:20 Taken


 


 BASIC METABOLIC PANEL,BMP [CHEM] DAILY Lab  19 01:15 Ordered


 


 CBC WITH AUTO DIFF [HEME] DAILY Lab  19 06:00 Ordered


 


 CBC WITH AUTO DIFF [HEME] DAILY Lab  19 06:00 Ordered


 


 CBC WITH AUTO DIFF [HEME] DAILY Lab  19 06:00 Ordered


 


 CBC WITH AUTO DIFF [HEME] Stat Lab  19 09:15 Received


 


 COMPREHENSIVE METABOLIC PN,CMP [CHEM] Stat Lab  19 09:15 Received


 


 CULTURE BLOOD [BC] Routine Lab  19 22:45 Received


 


 CULTURE BLOOD [BC] Stat Lab  19 22:45 Received


 


 TROPONIN I [CHEM] AM Lab  19 05:11 Ordered


 


 Acetaminophen [Tylenol] Med  19 01:04 Active





 650 mg PO Q4H PRN   


 


 Albuterol [Proventil Neb Soln] Med  19 01:04 Active





 2.5 mg NEB Q3H PRN   


 


 Albuterol/Ipratropium [DuoNeb 3.0-0.5 MG/3 ML] Med  19 07:00 Active





 3 ml INH 0700,1100,1600,2100   


 


 Bisacodyl [Dulcolax] Med  19 01:04 Active





 5 mg PO DAILY PRN   


 


 Cetirizine [ZyrTEC] Med  19 09:00 Active





 10 mg PO DAILY   


 


 Cholecalciferol (Vitamin D3) [Vitamin D3] Med  19 21:00 Active





 2,000 units PO BEDTIME   


 


 Enoxaparin [Lovenox] Med  19 01:15 Active





 30 mg SUBCUT BEDTIME   


 


 Escitalopram [Lexapro] Med  19 09:00 Active





 10 mg PO DAILY   


 


 Gabapentin [Neurontin] Med  19 21:00 Active





 300 mg PO BEDTIME   


 


 Heparin Sodium [Heparin Lock Flush 100 Units/ML] Med  19 00:01 Active





 500 units FLUSH ASDIRECTED PRN   


 


 Ibuprofen [Motrin] Med  19 01:04 Active





 600 mg PO Q6H PRN   


 


 Insulin Glarg,Human.Rec.Analog [LantUS Solostar] Med  19 21:00 Active





 12 units SUBCUT BEDTIME   


 


 Insulin Lispro [HumaLOG] Med  19 12:00 Ordered





 See Protocol  SUBCUT TIDMEALS   


 


 Levofloxacin/Dextrose 5%-Water [Levaquin in D5W 750 MG/ Med  19 09:30 

Ordered





 150 ML] 750 mg   





 Premix Bag 1 bag   





 IV Q48H   


 


 Levothyroxine Med  19 07:30 Active





 150 mcg PO ACBREAKFAST   


 


 Magnesium Hydroxide [Milk of Magnesia] Med  19 01:04 Active





 30 ml PO Q12H PRN   


 


 Melatonin Med  19 21:00 Active





 5 mg PO BEDTIME   


 


 Metoprolol Tartrate [Lopressor] Med  19 09:00 Active





 25 mg PO BID   


 


 Morphine Med  19 01:04 Active





 2 mg IVPUSH Q2H PRN   


 


 Ondansetron [Zofran] Med  19 01:04 Active





 4 mg IV Q4H PRN   


 


 Pantoprazole [ProTONIX***] Med  19 09:00 Active





 40 mg PO DAILY   


 


 Piperacillin/Tazobactam [Zosyn] 3.375 gm Med  19 04:00 Active





 Sodium Chloride 0.9% [Normal Saline] 50 ml   





 IV Q6H   


 


 Potassium Chloride [Klor-Con 10] Med  19 21:00 Active





 10 meq PO BEDTIME   


 


 Sodium Chloride 0.9% [Normal Saline] 1,000 ml Med  19 01:15 Active





 IV ASDIRECTED   


 


 Sodium Chloride 0.9% [Saline Flush] Med  19 00:03 Active





 10 ml FLUSH ASDIRECTED PRN   


 


 Vancomycin 1 gm Med  19 10:00 Ordered





 Sodium Chloride 0.9% [Normal Saline] 250 ml   





 IV Q12H   


 


 Zolpidem [Ambien] Med  19 01:12 Active





 10 mg PO BEDTIME PRN   


 


 hydroCHLOROthiazide Med  19 09:00 Active





 12.5 mg PO DAILY   


 


 metFORMIN [Glucophage] Med  19 08:00 Pending





 1,000 mg PO BIDMEALS   


 


 Resuscitation Status Routine Resus Stat  19 01:04 Ordered


 


 EKG 12 Lead [EK] AM Ther  19 05:11 Ordered


 


 EKG 12 Lead [EK] Routine Ther  19 22:20 Stop Req


 


 EKG 12 Lead [EK] Routine Ther  19 06:00 Ordered








 Medication Orders





Acetaminophen (Tylenol)  650 mg PO Q4H PRN


   PRN Reason: Pain (Mild 1-3)/fever


Albuterol (Proventil Neb Soln)  2.5 mg NEB Q3H PRN


   PRN Reason: Shortness Of Breath/wheezing


Albuterol/Ipratropium (Duoneb 3.0-0.5 Mg/3 Ml)  3 ml INH 0700,1100,1600,2100 [facility]


   Last Admin: 19 06:32  Dose: 3 ml


Bisacodyl (Dulcolax)  5 mg PO DAILY PRN


   PRN Reason: Constipation


Cetirizine HCl (Zyrtec)  10 mg PO DAILY [facility]


Cholecalciferol (Vitamin D3)  2,000 units PO BEDTIME [facility]


Enoxaparin Sodium (Lovenox)  30 mg SUBCUT BEDTIME [facility]


   Last Admin: 19 01:35  Dose: 30 mg


Escitalopram Oxalate (Lexapro)  10 mg PO DAILY [facility]


Gabapentin (Neurontin)  300 mg PO BEDTIME [facility]


Heparin Sodium (Porcine) (Heparin Lock Flush 100 Units/Ml)  500 units FLUSH 

ASDIRECTED PRN


   PRN Reason: Keep Vein Open


Hydrochlorothiazide (Hydrochlorothiazide)  12.5 mg PO DAILY [facility]


Sodium Chloride (Normal Saline)  1,000 mls @ 125 mls/hr IV ASDIRECTED [facility]


   Last Admin: 19 02:00  Dose: 125 mls/hr


Piperacillin Sod/Tazobactam (Sod 3.375 gm/ Sodium Chloride)  50 mls @ 100 mls/

hr IV Q6H [facility]


   Last Admin: 19 04:40  Dose: 100 mls/hr


Levofloxacin/Dextrose 750 mg/ (Premix)  150 mls @ 100 mls/hr IV Q48H [facility]


Vancomycin HCl 1 gm/ Sodium (Chloride)  250 mls @ 167 mls/hr IV Q12H [facility]


Ibuprofen (Motrin)  600 mg PO Q6H PRN


   PRN Reason: Pain (mild 1-3)


Insulin Glargine (Lantus Solostar)  12 units SUBCUT BEDTIME [facility]


Insulin Human Lispro (Humalog)  0 unit SUBCUT TIDMEALS [facility]; Protocol


Levothyroxine Sodium (Levothyroxine)  150 mcg PO ACBREAKFAST [facility]


   Last Admin: 19 06:32  Dose: 150 mcg


Magnesium Hydroxide (Milk Of Magnesia)  30 ml PO Q12H PRN


   PRN Reason: Constipation


Melatonin (Melatonin)  5 mg PO BEDTIME [facility]


   Last Admin: 19 01:57  Dose: 5 mg


Metformin HCl (Glucophage)  1,000 mg PO BIDMEALS [facility]


Metoprolol Tartrate (Lopressor)  25 mg PO BID [facility]


Morphine Sulfate (Morphine)  2 mg IVPUSH Q2H PRN


   PRN Reason: Pain (severe 7-10)


Ondansetron HCl (Zofran)  4 mg IV Q4H PRN


   PRN Reason: Nausea/Vomiting


   Last Admin: 19 07:43  Dose: 4 mg


Pantoprazole Sodium (Protonix***)  40 mg PO DAILY [facility]


Potassium Chloride (Klor-Con 10)  10 meq PO BEDTIME [facility]


Sodium Chloride (Saline Flush)  10 ml FLUSH ASDIRECTED PRN


   PRN Reason: Keep Vein Open


Zolpidem Tartrate (Ambien)  10 mg PO BEDTIME PRN


   PRN Reason: Insomnia








Assessment/Plan Comment:: 


I recommended IV fluid resuscitation, and IV antibiotics as been ordered. I've 

chosen vancomycin with renal dosing, IV Levaquin and Zosyn. Will control blood 

sugars sliding scale of insulin. I will continued regular diet and up previous 

medications with repeat labs including blood cultures, sputum cultures.